# Patient Record
Sex: MALE | URBAN - METROPOLITAN AREA
[De-identification: names, ages, dates, MRNs, and addresses within clinical notes are randomized per-mention and may not be internally consistent; named-entity substitution may affect disease eponyms.]

---

## 2023-04-11 ENCOUNTER — TELEPHONE (OUTPATIENT)
Dept: WOUND CARE | Facility: HOSPITAL | Age: 38
End: 2023-04-11

## 2023-04-11 NOTE — TELEPHONE ENCOUNTER
Kevin Adrienne  1985  [id number]    Summary: Patient called while this Wo was on vacation.  Patient had questions about the Penrose drain at the distal aspect of his abdominal incision.  It was pouched with urostomy bag in the hospital it was draining profusely.  Patient stated that the day before discharge and after discharge there was nothing in the bag at all but when he was changing his ostomy pouch that tube bags were stuck together so it had to come off.  As he was pulling off the urostomy pouch the Penrose drain came out longterm.  Patient made the decision to pull the rest the way out instead of pushing it back in.  Patient did a test with gauze over the hole all day long and rechecked that he said no drainage whatsoever.  This was 3 days ago.    Patient states that the wound is now pretty much closed up there is still some sutures in the proximal aspect of the incision.  He said pouch changing is going well he ordered a lot of stuff from [employer] and is calling [employer] today so he has no issues with supplies.  This Wo did tell him to watch out for induration erythema warmth and pain around this Penrose site but at this time he reports everything is WDL.    Encouraged patient to call back if anything changes.    Time spent teleconferencing with patient was: 15 minutes